# Patient Record
Sex: MALE | Race: WHITE | NOT HISPANIC OR LATINO | URBAN - METROPOLITAN AREA
[De-identification: names, ages, dates, MRNs, and addresses within clinical notes are randomized per-mention and may not be internally consistent; named-entity substitution may affect disease eponyms.]

---
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
## 2024-01-26 NOTE — PROCEDURE: COUNSELING
----- Message from NANI Armijo sent at 1/25/2024  4:08 PM CST -----  PRE-OP DATA and Check List - GI: Procedure: Colonoscopy (32220) with NuLytely (Split Dose) and EGD (05452) and dilation Prefers Dr. Oconnor 
Detail Level: Detailed
no

## 2025-05-06 ENCOUNTER — OFFICE VISIT (OUTPATIENT)
Dept: NEUROSURGERY | Age: 20
End: 2025-05-06

## 2025-05-06 DIAGNOSIS — S14.109S CERVICAL SPINAL CORD INJURY, SEQUELA: Primary | ICD-10-CM

## 2025-05-06 DIAGNOSIS — R25.2 SPASTICITY: ICD-10-CM

## 2025-05-06 DIAGNOSIS — Z97.8 PRESENCE OF INTRATHECAL BACLOFEN PUMP: ICD-10-CM

## 2025-05-06 NOTE — PROGRESS NOTES
the Last Year: Never true   Transportation Needs: No Transportation Needs (10/22/2024)    Received from [employer] - Transportation     Lack of Transportation (Medical): No     Lack of Transportation (Non-Medical): No   Physical Activity: Not on file   Stress: Not on file   Social Connections: Not on file   Intimate Partner Violence: Not on file   Housing Stability: Unknown (10/22/2024)    Received from [facility]    Housing Stability Vital Sign     Unable to Pay for Housing in the Last Year: No     Number of Times Moved in the Last Year: Not on file     Homeless in the Last Year: No        Patient Active Problem List   Diagnosis    Cervical spinal cord injury, sequela    Spasticity    Presence of intrathecal baclofen pump      Review of Systems   Musculoskeletal:  Positive for back pain.            Neurological Exam  Mental Status  Alert. Oriented to person, place, and time. Speech is normal.    Cranial Nerves  CN III, IV, VI: Pupils equal round and reactive to light bilaterally.    Gait   Abnormal gait (In a wheelchair needs to have his seating evaluated).       Physical Exam  Constitutional:       General: He is not in acute distress.     Appearance: Normal appearance. He is not ill-appearing.   HENT:      Head: Normocephalic.      Nose: Nose normal. No congestion or rhinorrhea.      Mouth/Throat:      Mouth: Mucous membranes are moist.      Pharynx: Oropharynx is clear. No oropharyngeal exudate or posterior oropharyngeal erythema.   Eyes:      Pupils: Pupils are equal, round, and reactive to light.   Pulmonary:      Effort: Pulmonary effort is normal. No respiratory distress.      Breath sounds: No stridor.   Skin:     General: Skin is warm and dry.      Coloration: Skin is not jaundiced.      Findings: No rash.   Neurological:      Mental Status: He is alert and oriented to person, place, and time. Mental status is at baseline.      Cranial Nerves: No cranial nerve deficit.      Motor: Weakness (Paraplegic

## 2025-05-24 PROBLEM — R25.2 SPASTICITY: Status: ACTIVE | Noted: 2025-05-24

## 2025-05-24 PROBLEM — S14.109S: Status: ACTIVE | Noted: 2025-05-24

## 2025-05-24 PROBLEM — Z97.8 PRESENCE OF INTRATHECAL BACLOFEN PUMP: Status: ACTIVE | Noted: 2025-05-24

## 2025-05-24 NOTE — ASSESSMENT & PLAN NOTE
Neurologically stable.  Is having some pain with extended seating.  He needs to be reevaluated for his seating.  I will also send him visual Brander would to evaluate him for some possible bracing that may help when he is sitting for extended periods during studying and those types of things